# Patient Record
Sex: FEMALE | Race: WHITE | NOT HISPANIC OR LATINO | URBAN - METROPOLITAN AREA
[De-identification: names, ages, dates, MRNs, and addresses within clinical notes are randomized per-mention and may not be internally consistent; named-entity substitution may affect disease eponyms.]

---

## 2020-06-26 ENCOUNTER — OFFICE VISIT (OUTPATIENT)
Dept: URBAN - METROPOLITAN AREA CLINIC 13 | Facility: CLINIC | Age: 20
End: 2020-06-26

## 2020-06-29 ENCOUNTER — OFFICE VISIT (OUTPATIENT)
Dept: URBAN - METROPOLITAN AREA CLINIC 113 | Facility: CLINIC | Age: 20
End: 2020-06-29

## 2020-07-25 ENCOUNTER — TELEPHONE ENCOUNTER (OUTPATIENT)
Dept: URBAN - METROPOLITAN AREA CLINIC 13 | Facility: CLINIC | Age: 20
End: 2020-07-25

## 2020-07-25 RX ORDER — DROSPIRENONE AND ETHINYL ESTRADIOL 0.02-3(28)
TAKE 1 TABLET DAILY KIT ORAL
Refills: 0 | OUTPATIENT
End: 2020-06-26

## 2020-07-26 ENCOUNTER — TELEPHONE ENCOUNTER (OUTPATIENT)
Dept: URBAN - METROPOLITAN AREA CLINIC 13 | Facility: CLINIC | Age: 20
End: 2020-07-26

## 2020-07-26 RX ORDER — DROSPIRENONE AND ETHINYL ESTRADIOL 0.03MG-3MG
TAKE 1 TABLET DAILY KIT ORAL
Refills: 0 | Status: ACTIVE | COMMUNITY
Start: 2020-06-26

## 2020-08-11 ENCOUNTER — TELEPHONE ENCOUNTER (OUTPATIENT)
Dept: URBAN - METROPOLITAN AREA CLINIC 113 | Facility: CLINIC | Age: 20
End: 2020-08-11

## 2021-01-15 ENCOUNTER — OFFICE VISIT (OUTPATIENT)
Dept: URBAN - METROPOLITAN AREA CLINIC 113 | Facility: CLINIC | Age: 21
End: 2021-01-15
Payer: OTHER GOVERNMENT

## 2021-01-15 VITALS
TEMPERATURE: 98.2 F | DIASTOLIC BLOOD PRESSURE: 71 MMHG | RESPIRATION RATE: 18 BRPM | HEART RATE: 89 BPM | HEIGHT: 59 IN | SYSTOLIC BLOOD PRESSURE: 119 MMHG | WEIGHT: 93 LBS | BODY MASS INDEX: 18.75 KG/M2

## 2021-01-15 DIAGNOSIS — R11.0 NAUSEA: ICD-10-CM

## 2021-01-15 DIAGNOSIS — R19.4 CHANGE IN BOWEL HABITS: ICD-10-CM

## 2021-01-15 DIAGNOSIS — R10.84 GENERALIZED ABDOMINAL PAIN: ICD-10-CM

## 2021-01-15 PROCEDURE — G9903 PT SCRN TBCO ID AS NON USER: HCPCS | Performed by: INTERNAL MEDICINE

## 2021-01-15 PROCEDURE — 99214 OFFICE O/P EST MOD 30 MIN: CPT | Performed by: INTERNAL MEDICINE

## 2021-01-15 PROCEDURE — G8427 DOCREV CUR MEDS BY ELIG CLIN: HCPCS | Performed by: INTERNAL MEDICINE

## 2021-01-15 PROCEDURE — 1036F TOBACCO NON-USER: CPT | Performed by: INTERNAL MEDICINE

## 2021-01-15 RX ORDER — DROSPIRENONE AND ETHINYL ESTRADIOL 0.03MG-3MG
TAKE 1 TABLET DAILY KIT ORAL
Refills: 0 | Status: ACTIVE | COMMUNITY
Start: 2020-06-26

## 2021-01-15 RX ORDER — DICYCLOMINE HYDROCHLORIDE 10 MG/1
1 TABLET CAPSULE ORAL
Qty: 60 | Refills: 3 | OUTPATIENT
Start: 2021-01-15 | End: 2021-05-15

## 2021-01-15 RX ORDER — OMEPRAZOLE 40 MG/1
1 CAPSULE 30 MINUTES BEFORE MORNING MEAL CAPSULE, DELAYED RELEASE ORAL ONCE A DAY
Qty: 30 | OUTPATIENT
Start: 2021-01-15

## 2021-01-15 NOTE — HPI-OTHER HISTORIES
. According to hospital records, she was presented to the hospital 12/11/2024 diarrhea.  She was admitted to the hospital and diagnosed with acute cystitis and sepsis. CT of the abdomen and pelvis with contrast 12/11/2020 : Enhancing, thickened bladder wall which can be seen in cystitis.  Hyperemic, fluid-filled small bowel in the pelvis likely enteritis or reactive from cystitis.  Trace free fluid in the pelvis likely physiologic.   Labs  12/14/2020: Gliadin IgG and IgA normal.   12/13/2020: CBC: WBC 10.4, hemoglobin 10.9, MCV 93.9, platelet 199.  BMP normal with exception of chloride 109, BUN 5.  LFTs normal TB 0.3, ALP 63, ALT 9, AST 23.   12/12/2020: CBC: WBC 11.5, hemoglobin 11.2, MCV 92, platelet 187.  BMP normal with the exception of glucose 107, sodium 134, potassium 3.2 TTG IgA less than 2, TTG IgG 6.  Urinalysis notable for few bacteria.  12/13/2020 CBC: WBC 10.4, hemoglobin,  Calcium 8.2. 12/11/2020: HIV nonreactive.  PT 13.5, INR 0.99.  PTT 28.9.  CBC: WBC 14.8, hemoglobin 11.7, MCV 93.1, platelet 200.  TSH 1.20.  Lactate 10.

## 2021-01-15 NOTE — HPI-TODAY'S VISIT:
This is a 20-year-old female with a history of familial adenomatous polyposis (mother and grandfather) presenting for follow-up after recent hospitalization (records below).   She was initially seen 6/29/2020 for a family history of polyposis.  Genetic testing for familial adenomatous polyposis was recommended.  If the test was positive, EGD and colonoscopy were to be scheduled. There are no records from genetic testing.  She reports genetic testing at [facility].  She states testing was negative.  She had frequent episodes of diarrhea before she was admitted to the hospital.  She started a probiotic afterward.  Her bowel movements have improved but have not returned to normal.  She saw her primary care provider who ordered stool studies.  However, as she has not experienced diarrhea and has not collected specimens.  She is having soft, unformed bowel movements or has stools the consistency of "round balls."  She is having a bowel movement every day.  Every other day, she has a bowel movement in the morning and in the evening.  She denies nocturnal stools.  She admits intermittent urgency to defecate.  She denies red blood per rectum or melena.  She has generalized aching in pain in her abdomen prior to bowel movements that improves after a stool.  She also reports intermittent postprandial abdominal pain.  She reports persistent intermittent nausea that is not significant enough to warrant medication.  She denies vomiting.  She has no appetite and has lost 7 pounds.  She denies heartburn or dysphagia.

## 2021-03-05 ENCOUNTER — OFFICE VISIT (OUTPATIENT)
Dept: URBAN - METROPOLITAN AREA CLINIC 113 | Facility: CLINIC | Age: 21
End: 2021-03-05

## 2021-04-05 ENCOUNTER — OFFICE VISIT (OUTPATIENT)
Dept: URBAN - METROPOLITAN AREA CLINIC 113 | Facility: CLINIC | Age: 21
End: 2021-04-05

## 2021-04-05 RX ORDER — OMEPRAZOLE 40 MG/1
1 CAPSULE 30 MINUTES BEFORE MORNING MEAL CAPSULE, DELAYED RELEASE ORAL ONCE A DAY
Qty: 30 | Status: ACTIVE | COMMUNITY
Start: 2021-01-15

## 2021-04-05 RX ORDER — DROSPIRENONE AND ETHINYL ESTRADIOL 0.03MG-3MG
TAKE 1 TABLET DAILY KIT ORAL
Refills: 0 | Status: ACTIVE | COMMUNITY
Start: 2020-06-26

## 2021-04-05 RX ORDER — DICYCLOMINE HYDROCHLORIDE 10 MG/1
1 TABLET CAPSULE ORAL
Qty: 60 | Refills: 3 | Status: ACTIVE | COMMUNITY
Start: 2021-01-15 | End: 2021-05-15

## 2021-04-05 NOTE — HPI-TODAY'S VISIT:
This is a 20-year-old female with a history of familial adenomatous polyposis (mother and grandfather) presenting for follow-up.  She was last seen 1/15/2021.  She reported negative genetic testing at [facility].  She had been admitted to the in December when she presented for diarrhea.  She was diagnosed with acute cystitis and sepsis.  She had a CT scan that demonstrated findings consistent with cystitis and evidence of enteritis or reaction from cystitis.  She reported improvement of bowel habits but she had not returned to normal.  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  She reported occasional urgency and postprandial abdominal pain associated with bowel movements.  She continued to experience intermittent nausea.  There was discussion there may be some contribution from an acid peptic disorder.  She was prescribed a trial of omeprazole and instructed to begin daily fiber.  She was prescribed dicyclomine for symptomatic relief of abdominal pain.  She was to complete stool studies previously ordered by her primary care physician if diarrhea recurred.

## 2021-05-19 ENCOUNTER — OFFICE VISIT (OUTPATIENT)
Dept: URBAN - METROPOLITAN AREA CLINIC 113 | Facility: CLINIC | Age: 21
End: 2021-05-19
Payer: OTHER GOVERNMENT

## 2021-05-19 VITALS
WEIGHT: 97 LBS | BODY MASS INDEX: 19.56 KG/M2 | DIASTOLIC BLOOD PRESSURE: 70 MMHG | HEIGHT: 59 IN | SYSTOLIC BLOOD PRESSURE: 111 MMHG | TEMPERATURE: 97.7 F | HEART RATE: 88 BPM

## 2021-05-19 DIAGNOSIS — R68.81 EARLY SATIETY: ICD-10-CM

## 2021-05-19 DIAGNOSIS — R10.84 GENERALIZED ABDOMINAL PAIN: ICD-10-CM

## 2021-05-19 DIAGNOSIS — R19.4 CHANGE IN BOWEL HABITS: ICD-10-CM

## 2021-05-19 DIAGNOSIS — R14.0 ABDOMINAL BLOATING: ICD-10-CM

## 2021-05-19 DIAGNOSIS — R07.89 ATYPICAL CHEST PAIN: ICD-10-CM

## 2021-05-19 PROCEDURE — 99214 OFFICE O/P EST MOD 30 MIN: CPT | Performed by: INTERNAL MEDICINE

## 2021-05-19 RX ORDER — OMEPRAZOLE 40 MG/1
1 CAPSULE 30 MINUTES BEFORE MORNING MEAL CAPSULE, DELAYED RELEASE ORAL ONCE A DAY
Qty: 30 | Refills: 5 | OUTPATIENT
Start: 2021-05-19

## 2021-05-19 RX ORDER — DROSPIRENONE AND ETHINYL ESTRADIOL 0.03MG-3MG
TAKE 1 TABLET DAILY KIT ORAL
Refills: 0 | Status: ON HOLD | COMMUNITY
Start: 2020-06-26

## 2021-05-19 RX ORDER — OMEPRAZOLE 40 MG/1
1 CAPSULE 30 MINUTES BEFORE MORNING MEAL CAPSULE, DELAYED RELEASE ORAL ONCE A DAY
Qty: 30 | Status: ON HOLD | COMMUNITY
Start: 2021-01-15

## 2021-05-19 NOTE — HPI-TODAY'S VISIT:
This is a 20-year-old female with a history of familial adenomatous polyposis (mother and grandfather) presenting for follow-up.   She was last seen 1/15/2021.  She underwent genetic testing and was negative for familial polyposis; she is average risk for colon cancer.  She had been admitted to the in December when she presented for diarrhea.  She was diagnosed with acute cystitis and sepsis.  She had a CT scan that demonstrated findings consistent with cystitis and evidence of enteritis or reaction from cystitis.  She reported improvement of bowel habits but she had not returned to normal.  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  She reported occasional urgency and postprandial abdominal pain associated with bowel movements.  She continued to experience intermittent nausea.  There was discussion there may be some contribution from an acid peptic disorder.  She was prescribed a trial of omeprazole and instructed to begin daily fiber.  She was prescribed dicyclomine for symptomatic relief of abdominal pain.  She was to complete stool studies previously ordered by her primary care physician if diarrhea recurred. She reports persistent "stomach problems."  She is complaining of constant belching, diarrhea and constipation, postprandial pain associated with overeating, chest pain, and describes early satiety.  She states her symptoms have been present since her last visit.  She took omeprazole for a month and was able to eat a more complete meal for breakfast and lunch but not for supper.  She is no longer on the medication.  She denies a sensation of regurgitation and denies heartburn.  However, for the last 2 weeks, she has been having daily episodes of pain further described as "discomfort" indicated in the left anterior chest.  The first episode began when she was at the gym exercising.  She had also eaten a fast food meal prior to exercise.  She felt as though she may "pass out."  This has not recurred during exercise.  Other episodes have occurred after eating and last 1 hour.  She has pain indicated in epigastrium and umbilical areas if she overeats.  She has to lie down when this occurs.  She has occasional pain in the lower abdomen before bowel movement that is relieved with a stool.  She denies nausea, vomiting, red blood per rectum, or melena.  She has a bowel movement every day.  Every other day, her stools are loose and every other day her stools are hard and shaped in "balls."  She has constant bloating in her abdomen that is unaffected by meals.  She has tried to avoid gluten and has stopped ingesting lactose without symptom improvement.  She has not started fiber as she was of the impression this would be prescribed.  She filled her prescription for dicyclomine but has not tried taking it.  Her weight has increased by 4 pounds since her last visit. She reports a history of syncopal episodes for which she underwent a cardiac evaluation in June of last year.  She reports that her cardiologist was unclear regarding the origin of her symptoms.  She has not had a syncopal episode since 2020 but because she can "feel it coming on" she is able to eat or lie down and her symptoms do not progress.  She denies dyspnea.  She reports palpitations associated with running or climbing stairs.

## 2021-08-02 ENCOUNTER — OFFICE VISIT (OUTPATIENT)
Dept: URBAN - METROPOLITAN AREA CLINIC 107 | Facility: CLINIC | Age: 21
End: 2021-08-02
Payer: OTHER GOVERNMENT

## 2021-08-02 ENCOUNTER — DASHBOARD ENCOUNTERS (OUTPATIENT)
Age: 21
End: 2021-08-02

## 2021-08-02 ENCOUNTER — WEB ENCOUNTER (OUTPATIENT)
Dept: URBAN - METROPOLITAN AREA CLINIC 107 | Facility: CLINIC | Age: 21
End: 2021-08-02

## 2021-08-02 VITALS
DIASTOLIC BLOOD PRESSURE: 61 MMHG | WEIGHT: 97 LBS | HEART RATE: 65 BPM | BODY MASS INDEX: 19.56 KG/M2 | SYSTOLIC BLOOD PRESSURE: 104 MMHG | TEMPERATURE: 97.7 F | HEIGHT: 59 IN

## 2021-08-02 DIAGNOSIS — R07.89 ATYPICAL CHEST PAIN: ICD-10-CM

## 2021-08-02 DIAGNOSIS — R10.84 GENERALIZED ABDOMINAL PAIN: ICD-10-CM

## 2021-08-02 DIAGNOSIS — R68.81 EARLY SATIETY: ICD-10-CM

## 2021-08-02 DIAGNOSIS — R11.0 NAUSEA: ICD-10-CM

## 2021-08-02 DIAGNOSIS — R19.4 CHANGE IN BOWEL HABITS: ICD-10-CM

## 2021-08-02 DIAGNOSIS — R14.0 ABDOMINAL BLOATING: ICD-10-CM

## 2021-08-02 PROBLEM — 102589003: Status: ACTIVE | Noted: 2021-05-19

## 2021-08-02 PROBLEM — 116289008: Status: ACTIVE | Noted: 2021-05-19

## 2021-08-02 PROBLEM — 102614006: Status: ACTIVE | Noted: 2021-01-15

## 2021-08-02 PROBLEM — 129851009: Status: ACTIVE | Noted: 2021-01-15

## 2021-08-02 PROBLEM — 442076002: Status: ACTIVE | Noted: 2021-05-19

## 2021-08-02 PROBLEM — 422587007: Status: ACTIVE | Noted: 2021-01-15

## 2021-08-02 PROCEDURE — 99213 OFFICE O/P EST LOW 20 MIN: CPT | Performed by: INTERNAL MEDICINE

## 2021-08-02 RX ORDER — PSYLLIUM HUSK (WITH SUGAR) 3 G/12 G
AS DIRECTED POWDER (GRAM) ORAL
Status: ACTIVE | COMMUNITY

## 2021-08-02 RX ORDER — SACCHAROMYCES BOULARDII 250 MG
1 CAPSULE CAPSULE ORAL TWICE A DAY
Status: ACTIVE | COMMUNITY

## 2021-08-02 RX ORDER — OMEPRAZOLE 40 MG/1
1 CAPSULE 30 MINUTES BEFORE MORNING MEAL CAPSULE, DELAYED RELEASE ORAL ONCE A DAY
Qty: 30 | Refills: 5 | Status: ACTIVE | COMMUNITY
Start: 2021-05-19

## 2021-08-02 NOTE — HPI-TODAY'S VISIT:
This is a 21-year-old female with a history of familial polyposis (mother and grandfather) status post genetic testing negative for familial polyposis, abdominal pain, change in bowel habits, early satiety, atypical chest pain, and bloating presenting for follow-up. She was last seen 5/19/2021 complaining of constant belching, diarrhea, constipation, postprandial abdominal pain associated with overeating, chest pain, and describing early satiety.  She had taken omeprazole for a month and was able to eat a more complete meal in the morning and at lunch but not with supper.  She denied GERD symptoms.  She reported episodic discomfort in the left anterior chest occurring once with exercise and later after meals.  She indicated epigastric and umbilical pain if she over ate.  She reported frequent bloating and described stools every other day of variable consistency.  She had dicyclomine on hand but had not tried taking it.  She had not started fiber as well.  She had undergone a cardiac evaluation in June 2020 due to syncopal episodes reporting that her cardiologist was unclear regarding the origin of her symptoms. She was instructed to discuss chest pain with her primary care physician.  She was prescribed omeprazole 40 mg daily.  She was to begin daily fiber and consider a low FODMAP diet.  She was instructed to use dicyclomine as needed for abdominal pain.  EGD was a future consideration.  She has been compliant with omeprazole 40 mg daily and she has been taking daily fiber.  She reports improvement of early satiety and abdominal fullness.  Nausea is infrequent.  She denies acid reflux or dysphagia.  She denies recurrence of chest pain.  She reports recent lower abdominal intermittent sharp pains that are unassociated with meals or defecation.  She plans to follow-up with her GYN.  Her bowels are moving every day on daily fiber coupled with a probiotic.  She denies any other abdominal symptoms.  She has noticed that eating chicken seems to bother her stomach.  She is avoiding this.  She denies fatty food intolerance.  Her weight is stable.

## 2021-12-14 ENCOUNTER — OFFICE VISIT (OUTPATIENT)
Dept: URBAN - METROPOLITAN AREA CLINIC 107 | Facility: CLINIC | Age: 21
End: 2021-12-14

## 2021-12-14 RX ORDER — PSYLLIUM HUSK (WITH SUGAR) 3 G/12 G
AS DIRECTED POWDER (GRAM) ORAL
Status: ACTIVE | COMMUNITY

## 2021-12-14 RX ORDER — OMEPRAZOLE 40 MG/1
1 CAPSULE 30 MINUTES BEFORE MORNING MEAL CAPSULE, DELAYED RELEASE ORAL ONCE A DAY
Qty: 30 | Refills: 5 | Status: ACTIVE | COMMUNITY
Start: 2021-05-19

## 2021-12-14 RX ORDER — SACCHAROMYCES BOULARDII 250 MG
1 CAPSULE CAPSULE ORAL TWICE A DAY
Status: ACTIVE | COMMUNITY